# Patient Record
Sex: MALE | Race: WHITE | Employment: OTHER | URBAN - NONMETROPOLITAN AREA
[De-identification: names, ages, dates, MRNs, and addresses within clinical notes are randomized per-mention and may not be internally consistent; named-entity substitution may affect disease eponyms.]

---

## 2017-01-03 DIAGNOSIS — N40.1 BENIGN NON-NODULAR PROSTATIC HYPERPLASIA WITH LOWER URINARY TRACT SYMPTOMS: Primary | ICD-10-CM

## 2017-01-03 NOTE — TELEPHONE ENCOUNTER
flomax         Last Written Prescription Date:1/27/16  Last Fill Quantity: 90, # refills: 3    Last Office Visit with [facility], [facility] or [facility] prescribing provider:  12/22/16   Future Office Visit:    Next 5 appointments (look out 90 days)     Mar 28, 2017  2:00 PM   (Arrive by 1:45 PM)   Office Visit with ZION Robertson MD   [facility] [place] ([facility] [place])    [address] [place] [address] [address]  [place] [address]   [phone]                  BP Readings from Last 3 Encounters:   12/22/16 122/70   11/29/16 100/62   11/22/16 147/94

## 2017-01-04 DIAGNOSIS — K59.01 SLOW TRANSIT CONSTIPATION: Primary | ICD-10-CM

## 2017-01-04 RX ORDER — TAMSULOSIN HYDROCHLORIDE 0.4 MG/1
CAPSULE ORAL
Qty: 90 CAPSULE | Refills: 3 | Status: SHIPPED | OUTPATIENT
Start: 2017-01-04 | End: 2017-11-10

## 2017-01-05 RX ORDER — BISACODYL 5 MG
TABLET, DELAYED RELEASE (ENTERIC COATED) ORAL
Qty: 30 TABLET | Refills: 5 | Status: SHIPPED | OUTPATIENT
Start: 2017-01-05 | End: 2017-06-07

## 2017-03-24 ENCOUNTER — TELEPHONE (OUTPATIENT)
Dept: FAMILY MEDICINE | Facility: OTHER | Age: 82
End: 2017-03-24

## 2017-03-24 NOTE — TELEPHONE ENCOUNTER
I can not find in meds under epic.  i think they are just vitamin.  Can have 3 months with 3 rf. Call it in.

## 2017-03-24 NOTE — TELEPHONE ENCOUNTER
Pt of . [facility] request for Preservision caps. Not on current med list. Last office visit on 12.22.16.Please advise.Thank you

## 2017-03-24 NOTE — TELEPHONE ENCOUNTER
Called [facility] and spoke with [facility] pharmacist.Gave verbal for Preservison 3 months with 3 fills per .

## 2017-04-06 ENCOUNTER — TELEPHONE (OUTPATIENT)
Dept: FAMILY MEDICINE | Facility: OTHER | Age: 82
End: 2017-04-06

## 2017-04-06 NOTE — TELEPHONE ENCOUNTER
Daughter wont be able to get to patients appointment. States he did just see the cardiologist in February. She will give her the father the option to take a cab or will change appointment to May.  KIERRA BUTCHER

## 2017-04-06 NOTE — TELEPHONE ENCOUNTER
Pt daughter calling and can't be at the appointment tomorrow and wondering if this can be rescheduled to a date when she can be here.

## 2017-04-18 ENCOUNTER — MEDICAL CORRESPONDENCE (OUTPATIENT)
Dept: HEALTH INFORMATION MANAGEMENT | Facility: HOSPITAL | Age: 82
End: 2017-04-18

## 2017-04-27 DIAGNOSIS — K59.00 CONSTIPATION, UNSPECIFIED CONSTIPATION TYPE: ICD-10-CM

## 2017-04-27 RX ORDER — ASPIRIN 81 MG
TABLET, DELAYED RELEASE (ENTERIC COATED) ORAL
Qty: 90 TABLET | Refills: 3
Start: 2017-04-27

## 2017-04-27 NOTE — TELEPHONE ENCOUNTER
Written order for Colace 100 mg, 2 tabs daily entered into med list and written [facility].

## 2017-05-10 DIAGNOSIS — I48.19 PERSISTENT ATRIAL FIBRILLATION (H): ICD-10-CM

## 2017-05-11 RX ORDER — RIVAROXABAN 15 MG/1
TABLET, FILM COATED ORAL
Qty: 30 TABLET | Refills: 0 | Status: SHIPPED | OUTPATIENT
Start: 2017-05-11 | End: 2017-07-14

## 2017-05-13 ENCOUNTER — HOSPITAL ENCOUNTER (EMERGENCY)
Facility: HOSPITAL | Age: 82
Discharge: HOME | End: 2017-05-14
Attending: EMERGENCY MEDICINE | Admitting: EMERGENCY MEDICINE
Payer: COMMERCIAL

## 2017-05-13 DIAGNOSIS — K59.01 SLOW TRANSIT CONSTIPATION: Primary | ICD-10-CM

## 2017-05-13 DIAGNOSIS — R41.89 COGNITIVE DECLINE: ICD-10-CM

## 2017-05-13 LAB
ALBUMIN SERPL-MCNC: 3.3 G/DL (ref 3.4–5)
ALP SERPL-CCNC: 68 U/L (ref 40–150)
ALT SERPL W P-5'-P-CCNC: 12 U/L (ref 0–70)
ANION GAP SERPL CALCULATED.3IONS-SCNC: 11 MMOL/L (ref 3–14)
AST SERPL W P-5'-P-CCNC: 18 U/L (ref 0–45)
BASOPHILS # BLD AUTO: 0.1 10E9/L (ref 0–0.2)
BASOPHILS NFR BLD AUTO: 1 %
BILIRUB SERPL-MCNC: 0.3 MG/DL (ref 0.2–1.3)
BUN SERPL-MCNC: 22 MG/DL (ref 7–30)
CALCIUM SERPL-MCNC: 8.8 MG/DL (ref 8.5–10.1)
CHLORIDE SERPL-SCNC: 108 MMOL/L (ref 94–109)
CO2 SERPL-SCNC: 23 MMOL/L (ref 20–32)
CREAT SERPL-MCNC: 1.45 MG/DL (ref 0.66–1.25)
DIFFERENTIAL METHOD BLD: ABNORMAL
EOSINOPHIL # BLD AUTO: 0.2 10E9/L (ref 0–0.7)
EOSINOPHIL NFR BLD AUTO: 4 %
ERYTHROCYTE [DISTWIDTH] IN BLOOD BY AUTOMATED COUNT: 15.5 % (ref 10–15)
GFR SERPL CREATININE-BSD FRML MDRD: 45 ML/MIN/1.7M2
GLUCOSE SERPL-MCNC: 83 MG/DL (ref 70–99)
HCT VFR BLD AUTO: 35.6 % (ref 40–53)
HGB BLD-MCNC: 11.9 G/DL (ref 13.3–17.7)
IMM GRANULOCYTES # BLD: 0 10E9/L (ref 0–0.4)
IMM GRANULOCYTES NFR BLD: 0.2 %
LYMPHOCYTES # BLD AUTO: 1.9 10E9/L (ref 0.8–5.3)
LYMPHOCYTES NFR BLD AUTO: 37 %
MCH RBC QN AUTO: 30.5 PG (ref 26.5–33)
MCHC RBC AUTO-ENTMCNC: 33.4 G/DL (ref 31.5–36.5)
MCV RBC AUTO: 91 FL (ref 78–100)
MONOCYTES # BLD AUTO: 0.4 10E9/L (ref 0–1.3)
MONOCYTES NFR BLD AUTO: 8.2 %
NEUTROPHILS # BLD AUTO: 2.6 10E9/L (ref 1.6–8.3)
NEUTROPHILS NFR BLD AUTO: 49.6 %
NRBC # BLD AUTO: 0 10*3/UL
NRBC BLD AUTO-RTO: 0 /100
PLATELET # BLD AUTO: 154 10E9/L (ref 150–450)
POTASSIUM SERPL-SCNC: 4.7 MMOL/L (ref 3.4–5.3)
PROT SERPL-MCNC: 6.5 G/DL (ref 6.8–8.8)
RBC # BLD AUTO: 3.9 10E12/L (ref 4.4–5.9)
SODIUM SERPL-SCNC: 142 MMOL/L (ref 133–144)
WBC # BLD AUTO: 5.2 10E9/L (ref 4–11)

## 2017-05-13 PROCEDURE — 85025 COMPLETE CBC W/AUTO DIFF WBC: CPT | Performed by: EMERGENCY MEDICINE

## 2017-05-13 PROCEDURE — 36415 COLL VENOUS BLD VENIPUNCTURE: CPT | Performed by: EMERGENCY MEDICINE

## 2017-05-13 PROCEDURE — 74022 RADEX COMPL AQT ABD SERIES: CPT | Mod: TC

## 2017-05-13 PROCEDURE — 99284 EMERGENCY DEPT VISIT MOD MDM: CPT | Performed by: EMERGENCY MEDICINE

## 2017-05-13 PROCEDURE — 99284 EMERGENCY DEPT VISIT MOD MDM: CPT

## 2017-05-13 PROCEDURE — 80053 COMPREHEN METABOLIC PANEL: CPT | Performed by: EMERGENCY MEDICINE

## 2017-05-13 NOTE — ED AVS SNAPSHOT
[facility]    [address]    [place] [address]    Phone:  [phone]                                       Rogelio Garcia   MRN: [id number]    Department:  [facility]   Date of Visit:  5/13/2017           Patient Information     Date Of Birth          5/17/1920        Your diagnoses for this visit were:     Cognitive decline     Slow transit constipation        You were seen by Tapan Velasco MD.        Discharge Instructions         Constipation (Adult)  Constipation means that you have bowel movements that are less frequent than usual. Stools often become very hard and difficult to pass.  Constipation is very common. At some point in life it affects almost everyone. Since everyone's bowel habits are different, what is constipation to one person may not be to another. Your healthcare provider may do tests to diagnose constipation. It depends on what he or she finds when evaluating you.    Symptoms of constipation include:    Abdominal pain    Bloating    Vomiting    Painful bowel movements    Itching, swelling, bleeding, or pain around the anus  Causes  Constipation can have many causes. These include:    Diet low in fiber    Too much dairy    Not drinking enough liquids    Lack of exercise or physical activity. This is especially true for older adults.    Changes in lifestyle or daily routine, including pregnancy, aging, work, and travel    Frequent use or misuse of laxatives    Ignoring the urge to have a bowel movement or delaying it until later    Medicines, such as certain prescription pain medicines, iron supplements, antacids, certain antidepressants, and calcium supplements    Diseases like irritable bowel syndrome, bowel obstructions, stroke, diabetes, thyroid disease, Parkinson disease, hemorrhoids, and colon cancer  Complications  Potential complications of constipation can include:    Hemorrhoids    Rectal bleeding from hemorrhoids or anal fissures (skin  tears)    Hernias    Dependency on laxatives    Chronic constipation    Fecal impaction    Bowel obstruction or perforation  Home care  All treatment should be done after talking with your healthcare provider. This is especially true if you have another medical problems, are taking prescription medicines, or are an older adult. Treatment most often involves lifestyle changes. You may also need medicines. Your healthcare provider will tell you which will work best for you. Follow the advice below to help avoid this problem in the future.  Lifestyle changes  These lifestyle changes can help prevent constipation:    Diet. Eat a high-fiber diet, with fresh fruit and vegetables, and reduce dairy intake, meats, and processed foods    Fluids. It's important to get enough fluids each day. Drink plenty of water when you eat more fiber. If you are on diet that limits the amount of fluid you can have, talk about this with your healthcare provider.    Regular exercise. Check with your healthcare provider first.  Medications  Take any medicines as directed. Some laxatives are safe to use only every now and then. Others can be taken on a regular basis. Talk with your doctor or pharmacist if you have questions.  Prescription pain medicines can cause constipation. If you are taking this kind of medicine, ask your healthcare provider if you should also take a stool softener.  Medicines you may take to treat constipation include:    Fiber supplements    Stool softeners    Laxatives    Enemas    Rectal suppositories  Follow-up care  Follow up with your healthcare provider if symptoms don't get better in the next few days. You may need to have more tests or see a specialist.  Call 911  Call 911 if any of these occur:    Trouble breathing    Stiff, rigid abdomen that is severely painful to touch    Confusion    Fainting or loss of consciousness    Rapid heart rate    Chest pain  When to seek medical advice  Call your healthcare provider  right away if any of these occur:    Fever over 100.4 F (38 C)    Failure to resume normal bowel movements    Pain in your abdomen or back gets worse    Nausea or vomiting    Swelling in your abdomen    Blood in the stool    Black, tarry stool    Involuntary weight loss    Weakness    [id number] [employer] [address] [place] [address]. All rights reserved. This information is not intended as a substitute for professional medical care. Always follow your healthcare professional's instructions.             Review of your medicines      Our records show that you are taking the medicines listed below. If these are incorrect, please call your family doctor or clinic.        Dose / Directions Last dose taken    bisacodyl 5 MG EC tablet   Quantity:  30 tablet        TAKE 1 TABLET BY MOUTH DAILY AS NEEDED FOR CONSTIPATION   Refills:  5        docusate sodium 100 MG tablet   Commonly known as:  COLACE   Quantity:  90 tablet        Take 2 tabs (200 mg ) po daily for constipation   Refills:  3        polyethylene glycol powder   Commonly known as:  MIRALAX/GLYCOLAX   Quantity:  527 g        MIX 17GM (1 CAPFUL) IN 4 TO 8 OUNCES OF BEVERAGE DAILY   Refills:  6        PRESERVISION AREDS PO   Dose:  1 tablet        Take 1 tablet by mouth 2 times daily   Refills:  0        tamsulosin 0.4 MG capsule   Commonly known as:  FLOMAX   Quantity:  90 capsule        TAKE 1 CAPSULE BY MOUTH DAILY   Refills:  3        XARELTO 15 MG Tabs tablet   Quantity:  30 tablet   Generic drug:  rivaroxaban ANTICOAGULANT        TAKE 1 TABLET BY MOUTH DAILY WITH DINNER.   Refills:  0                Procedures and tests performed during your visit     CBC with platelets differential    Comprehensive metabolic panel    XR Abdomen Series      Orders Needing Specimen Collection     Ordered          05/13/17 2256  UA with Microscopic reflex to Culture - ROUTINE, Prio: Routine, Needs to be Collected     Scheduled Task Status  "  17 2257 Collect UA with Microscopic reflex to Culture Open   Order Class:  PCU Collect                  Pending Results     Date and Time Order Name Status Description    2017 2256 XR Abdomen Series In process             Pending Culture Results     No orders found for last 3 day(s).            Thank you for choosing [facility]       Thank you for choosing [facility] for your care. Our goal is always to provide you with excellent care. Hearing back from our patients is one way we can continue to improve our services. Please take a few minutes to complete the written survey that you may receive in the mail after you visit with us. Thank you!        [employer] Information     [email or web address] lets you send messages to your doctor, view your test results, renew your prescriptions, schedule appointments and more. To sign up, go to [email or web address] . Click on \"Log in\" on the left side of the screen, which will take you to the Welcome page. Then click on \"Sign up Now\" on the right side of the page.     You will be asked to enter the access code listed below, as well as some personal information. Please follow the directions to create your username and password.     Your access code is: [email or web address]  Expires: 2017  1:57 AM     Your access code will  in 90 days. If you need help or a new code, please call your [facility] clinic or [phone].        Care EveryWhere ID     This is your Care EveryWhere ID. This could be used by other organizations to access your [facility] medical records  [id number]        After Visit Summary       This is your record. Keep this with you and show to your community pharmacist(s) and doctor(s) at your next visit.                  "

## 2017-05-13 NOTE — ED AVS SNAPSHOT
[facility]    [address] [address]    Phone:  [phone]                                       Rogelio Garcia   MRN: [id number]    Department:  [facility]   Date of Visit:  5/13/2017           After Visit Summary Signature Page     I have received my discharge instructions, and my questions have been answered. I have discussed any challenges I see with this plan with the nurse or doctor.    ..........................................................................................................................................  Patient/Patient Representative Signature      ..........................................................................................................................................  Patient Representative Print Name and Relationship to Patient    ..................................................               ................................................  Date                                            Time    ..........................................................................................................................................  Reviewed by Signature/Title    ...................................................              ..............................................  Date                                                            Time

## 2017-05-14 VITALS
HEART RATE: 98 BPM | OXYGEN SATURATION: 98 % | SYSTOLIC BLOOD PRESSURE: 139 MMHG | RESPIRATION RATE: 16 BRPM | TEMPERATURE: 97.9 F | DIASTOLIC BLOOD PRESSURE: 101 MMHG

## 2017-05-14 NOTE — DISCHARGE INSTRUCTIONS

## 2017-05-14 NOTE — ED PROVIDER NOTES
"  History     Chief Complaint   Patient presents with     Constipation     x 4-5 days, denies pain or bloating     Altered Mental Status     family reports \"cognitive issues,\" concern for UTI      HPI  Rogelio Garcia is a 89 year old male who presents to the emergency department accompanied by the stepdaughter.  Patient reports constipation and has not been passing stool for the last 5 days.  Denies any abdominal pain, nausea or vomiting or fever.  He has had chronic constipation for several years and is currently on MiraLAX and to other over-the-counter laxatives.    The stepdaughter is also concerned about cognitive decline.  Patient was not able to recorgnize one of the family members earlier this morning.  His cognition and memory have been declining over the last few months.  So far they've not been evaluated by a neurologist.  She also wants a UTI to be ruled out.    I have reviewed the Medications, Allergies, Past Medical and Surgical History, and Social History in the Epic system.    Review of Systems   All other systems reviewed and are negative.      Physical Exam   BP: 146/90  Pulse: 98  Temp: 96.7  F (35.9  C)  Resp: 20  SpO2: 99 %  Physical Exam   Constitutional: He is oriented to person, place, and time. He appears well-developed and well-nourished. No distress.   HENT:   Head: Atraumatic.   Mouth/Throat: Oropharynx is clear and moist. No oropharyngeal exudate.   Eyes: Pupils are equal, round, and reactive to light. No scleral icterus.   Neck: Normal range of motion. Neck supple.   Cardiovascular: Normal rate, regular rhythm, normal heart sounds and intact distal pulses.    Pulmonary/Chest: Breath sounds normal. No respiratory distress. He has no wheezes. He has no rales.   Abdominal: Soft. Bowel sounds are normal. He exhibits no mass. There is no tenderness. There is no rebound.   Musculoskeletal: He exhibits no edema or tenderness.   Neurological: He is alert and oriented to person, place, and " time.   Skin: Skin is warm. No rash noted. He is not diaphoretic.   Nursing note and vitals reviewed.      ED Course   Patient given Fleet enema with no effect.  Normal CBC, CMP.    ED Course     Procedures         Labs Ordered and Resulted from Time of ED Arrival Up to the Time of Departure from the ED   CBC WITH PLATELETS DIFFERENTIAL - Abnormal; Notable for the following:        Result Value    RBC Count 3.90 (*)     Hemoglobin 11.9 (*)     Hematocrit 35.6 (*)     RDW 15.5 (*)     All other components within normal limits   COMPREHENSIVE METABOLIC PANEL - Abnormal; Notable for the following:     Creatinine 1.45 (*)     GFR Estimate 45 (*)     GFR Estimate If Black 54 (*)     Albumin 3.3 (*)     Protein Total 6.5 (*)     All other components within normal limits   ROUTINE UA WITH MICROSCOPIC REFLEX TO CULTURE       Assessments & Plan (with Medical Decision Making)     I have reviewed the nursing notes.    I have reviewed the findings, diagnosis, plan and need for follow up with the patient.    New Prescriptions    No medications on file       Final diagnoses:   Cognitive decline   Slow transit constipation     Discussed with patient and her stepdaughter that the patient may need colonoscopy to rule out colon mass that may be causing obstruction.  He will discuss this with his PCP.    Also discussed with patient and her stepdaughter that patient will need to be seen by neurologist because of the obesity decline to rule out dementia.  He may also need MRI.  All questions answered.    5/13/2017   [facility]     Tapan Velasco MD  05/14/17 0143

## 2017-05-14 NOTE — ED NOTES
Reviewed discharge instruction with pt and family. No questions at this time. No results from enema but denies pain. Will seek primary care provider for follow up prn.

## 2017-05-22 ENCOUNTER — OFFICE VISIT (OUTPATIENT)
Dept: FAMILY MEDICINE | Facility: OTHER | Age: 82
End: 2017-05-22
Attending: FAMILY MEDICINE
Payer: COMMERCIAL

## 2017-05-22 VITALS
BODY MASS INDEX: 21.67 KG/M2 | TEMPERATURE: 97.4 F | OXYGEN SATURATION: 98 % | SYSTOLIC BLOOD PRESSURE: 116 MMHG | HEIGHT: 68 IN | DIASTOLIC BLOOD PRESSURE: 56 MMHG | WEIGHT: 143 LBS | HEART RATE: 79 BPM

## 2017-05-22 DIAGNOSIS — K59.01 SLOW TRANSIT CONSTIPATION: Primary | ICD-10-CM

## 2017-05-22 PROCEDURE — 99213 OFFICE O/P EST LOW 20 MIN: CPT | Performed by: FAMILY MEDICINE

## 2017-05-22 PROCEDURE — 99212 OFFICE O/P EST SF 10 MIN: CPT

## 2017-05-22 ASSESSMENT — PAIN SCALES - GENERAL: PAINLEVEL: NO PAIN (0)

## 2017-05-22 NOTE — PROGRESS NOTES
[facility]    Rogelio Garcia, 89 year old, male presents with   Chief Complaint   Patient presents with     Constipation     Follow up from the ER seen 05/13/2017, Slow transit constipation, coginitive decline. states metamucil is helping. Last BM was this morning. Denies constipation.      Memory Loss     He has some questions regding possible memory loss.  Just the fact that he's been analyzing the situation tells me he does not have significant dementia.  He has a lot of self-analysis.  He is very organized.  He is worried because he has some questions regarding thee for his medication for the constipation.  I think some of his issues are that he's very organized and he gets very anxious and with new data that it might be hard to process but he has not had any major problems such as getting lost       PAST MEDICAL HISTORY:  Past Medical History:   Diagnosis Date     Aortic sclerosis 03/09/2012    with mod aortic stenosis peak 59 mean 34 11-09     Benign prostate hyperplasia, NOS 01/15/2013     Erectile Dysfunction 01/01/2011     Hypercholesterolemia 01/01/2011     Hypertension, Benign 01/01/2011       PAST SURGICAL HISTORY:  Past Surgical History:   Procedure Laterality Date     Carpal tunnel release  2010    RT     Cataract  2012    RT     COLONOSCOPY  2012    failed,so did Barium Enema- neg     ENDOSCOPIC RELEASE CARPAL TUNNEL Right 4/7/2015    Procedure: ENDOSCOPIC RELEASE CARPAL TUNNEL;  Surgeon: Antwon Soto MD;  Location: [facility]     hernia repair  2003    RT, Hernia, inguinal     left inguinal hernia  2012     TRANSCATHETER AORTIC-VALVE REPLACEMENT         MEDICATIONS:  Prior to Admission medications    Medication Sig Start Date End Date Taking? Authorizing Provider   XARELTO 15 MG TABS tablet TAKE 1 TABLET BY MOUTH DAILY WITH DINNER. 5/11/17  Yes ZION Robertson MD   docusate sodium (COLACE) 100 MG tablet Take 2 tabs (200 mg ) po daily for constipation 4/27/17  Yes ZION Robertson  "MD Jorje   bisacodyl (DULCOLAX) 5 MG EC tablet TAKE 1 TABLET BY MOUTH DAILY AS NEEDED FOR CONSTIPATION 1/5/17  Yes Suresh Busby MD   tamsulosin (FLOMAX) 0.4 MG capsule TAKE 1 CAPSULE BY MOUTH DAILY 1/4/17  Yes ZION Robertson MD   polyethylene glycol (MIRALAX/GLYCOLAX) powder MIX 17GM (1 CAPFUL) IN 4 TO 8 OUNCES OF BEVERAGE DAILY 11/8/16  Yes ZION Robertson MD   Multiple Vitamins-Minerals (PRESERVISION AREDS PO) Take 1 tablet by mouth 2 times daily    Yes Reported, Patient       ALLERGIES:     Allergies   Allergen Reactions     Levofloxacin Other (See Comments)     Bilateral heel tendonitis       ROS:  Constitutional, neuro, ENT, endocrine, pulmonary, cardiac, gastrointestinal, genitourinary, musculoskeletal, integument and psychiatric systems are negative, except as otherwise noted.      EXAM:  /56 (BP Location: Left arm, Patient Position: Chair, Cuff Size: Adult Regular)  Pulse 79  Temp 97.4  F (36.3  C) (Tympanic)  Ht 5' 8\" (1.727 m)  Wt 143 lb (64.9 kg)  SpO2 98%  BMI 21.74 kg/m2 Body mass index is 21.74 kg/(m^2).   GENERAL APPEARANCE: healthy, alert and no distress  EYES: Eyes grossly normal to inspection, PERRL and conjunctivae and sclerae normal  RESP: lungs clear to auscultation - no rales, rhonchi or wheezes  ABDOMEN: soft, nontender, without hepatosplenomegaly or masses and bowel sounds normal  NEURO: Normal strength and tone, mentation intact and speech normal  PSYCH: mentation appears normal and affect normal/bright  Lab/ X-ray  No results found for this or any previous visit (from the past 24 hour(s)).    ASSESSMENT/PLAN:    ICD-10-CM    1. Slow transit constipation K59.01 He had bowel movement today.  Told him to keep the Metamucil daily unless he is having significantly loose stools then back off on the frequency.  He also is using Dulcolax and Colace.  Here with daughter.  Seems to be doing well.  We explained there is a variation with stool frequency but the most important thing  " That I don't want him to have to  Strain to have a bowel movement. We also discussed that it was brought up by him getting a colonoscopy.  We discussed he's at risk if he were to do it for perforation and he could die at and his age he states he doesn't want aggressive things done.  His daughter was here in both patient and his daughter felt comfortable not pursuing any aggressive studies such as colonoscopy.Regarding his memory he is showing significant insight into his situation and at this point I don't think he needs any further workup.          KD Robertson MD  May 22, 2017

## 2017-05-22 NOTE — NURSING NOTE
"Chief Complaint   Patient presents with     Constipation     Follow up from the ER seen 05/13/2017, Slow transit constipation, coginitive decline. states metamucil is helping. Last BM was this morning. Denies constipation.        Initial /56 (BP Location: Left arm, Patient Position: Chair, Cuff Size: Adult Regular)  Pulse 79  Temp 97.4  F (36.3  C) (Tympanic)  Ht 5' 8\" (1.727 m)  Wt 143 lb (64.9 kg)  SpO2 98%  BMI 21.74 kg/m2 Estimated body mass index is 21.74 kg/(m^2) as calculated from the following:    Height as of this encounter: 5' 8\" (1.727 m).    Weight as of this encounter: 143 lb (64.9 kg).  Medication Reconciliation: complete     KIERRA BUTCHER      "

## 2017-05-22 NOTE — MR AVS SNAPSHOT
"              After Visit Summary   2017    Rogelio Garcia    MRN: [id number]           Patient Information     Date Of Birth          1920        Visit Information        Provider Department      2017 3:15 PM [facility] MD [facility]        Care Instructions    Follow up as needed        Follow-ups after your visit        Who to contact     If you have questions or need follow up information about today's clinic visit or your schedule please contact [facility] directly at [phone].  Normal or non-critical lab and imaging results will be communicated to you by MyChart, letter or phone within 4 business days after the clinic has received the results. If you do not hear from us within 7 days, please contact the clinic through [employer] or phone. If you have a critical or abnormal lab result, we will notify you by phone as soon as possible.  Submit refill requests through [employer] or call your pharmacy and they will forward the refill request to us. Please allow 3 business days for your refill to be completed.          Additional Information About Your Visit        MyChart Information     [employer] lets you send messages to your doctor, view your test results, renew your prescriptions, schedule appointments and more. To sign up, go to [email or web address] . Click on \"Log in\" on the left side of the screen, which will take you to the Welcome page. Then click on \"Sign up Now\" on the right side of the page.     You will be asked to enter the access code listed below, as well as some personal information. Please follow the directions to create your username and password.     Your access code is: [email or web address]  Expires: 2017  1:57 AM     Your access code will  in 90 days. If you need help or a new code, please call your [facility] or [phone].        Care EveryWhere ID     This is your Care EveryWhere ID. This could be used by other organizations to " "access your [facility] medical records  [id number]        Your Vitals Were     Pulse Temperature Height Pulse Oximetry BMI (Body Mass Index)       79 97.4  F (36.3  C) (Tympanic) 5' 8\" (1.727 m) 98% 21.74 kg/m2        Blood Pressure from Last 3 Encounters:   05/22/17 116/56   05/14/17 (!) 139/101   12/22/16 122/70    Weight from Last 3 Encounters:   05/22/17 143 lb (64.9 kg)   12/22/16 141 lb (64 kg)   11/29/16 140 lb (63.5 kg)              Today, you had the following     No orders found for display       Primary Care Provider Office Phone # Fax #    R Jorje Robertson [phone]       [facility] [address] [place] [address]        Thank you!     Thank you for choosing [facility]  for your care. Our goal is always to provide you with excellent care. Hearing back from our patients is one way we can continue to improve our services. Please take a few minutes to complete the written survey that you may receive in the mail after your visit with us. Thank you!             Your Updated Medication List - Protect others around you: Learn how to safely use, store and throw away your medicines at www.disposemymeds.org.          This list is accurate as of: 5/22/17  3:43 PM.  Always use your most recent med list.                   Brand Name Dispense Instructions for use    bisacodyl 5 MG EC tablet     30 tablet    TAKE 1 TABLET BY MOUTH DAILY AS NEEDED FOR CONSTIPATION       docusate sodium 100 MG tablet    COLACE    90 tablet    Take 2 tabs (200 mg ) po daily for constipation       polyethylene glycol powder    MIRALAX/GLYCOLAX    527 g    MIX 17GM (1 CAPFUL) IN 4 TO 8 OUNCES OF BEVERAGE DAILY       PRESERVISION AREDS PO      Take 1 tablet by mouth 2 times daily       tamsulosin 0.4 MG capsule    FLOMAX    90 capsule    TAKE 1 CAPSULE BY MOUTH DAILY       XARELTO 15 MG Tabs tablet   Generic drug:  rivaroxaban ANTICOAGULANT     30 tablet    TAKE 1 TABLET BY MOUTH DAILY " WITH DINNER.

## 2017-06-07 DIAGNOSIS — K59.01 SLOW TRANSIT CONSTIPATION: ICD-10-CM

## 2017-06-09 RX ORDER — BISACODYL 5 MG
TABLET, DELAYED RELEASE (ENTERIC COATED) ORAL
Qty: 30 TABLET | Refills: 7 | Status: SHIPPED | OUTPATIENT
Start: 2017-06-09 | End: 2017-11-10

## 2017-06-09 NOTE — TELEPHONE ENCOUNTER
Dulcolax 5mg      Last Written Prescription Date: 12/28/15  Last Fill Quantity: 30,  # refills: 0   Last Office Visit with G, P or [facility] prescribing provider: 5/22/17

## 2017-07-14 DIAGNOSIS — I48.19 PERSISTENT ATRIAL FIBRILLATION (H): ICD-10-CM

## 2017-07-18 RX ORDER — RIVAROXABAN 15 MG/1
TABLET, FILM COATED ORAL
Qty: 30 TABLET | Refills: 3 | Status: SHIPPED | OUTPATIENT
Start: 2017-07-18

## 2017-07-19 ENCOUNTER — MEDICAL CORRESPONDENCE (OUTPATIENT)
Dept: HEALTH INFORMATION MANAGEMENT | Facility: HOSPITAL | Age: 82
End: 2017-07-19

## 2017-08-03 ENCOUNTER — MEDICAL CORRESPONDENCE (OUTPATIENT)
Dept: HEALTH INFORMATION MANAGEMENT | Facility: HOSPITAL | Age: 82
End: 2017-08-03

## 2017-10-02 ENCOUNTER — MEDICAL CORRESPONDENCE (OUTPATIENT)
Dept: HEALTH INFORMATION MANAGEMENT | Facility: HOSPITAL | Age: 82
End: 2017-10-02

## 2017-11-10 DIAGNOSIS — K59.01 SLOW TRANSIT CONSTIPATION: ICD-10-CM

## 2017-11-10 DIAGNOSIS — N40.1 BENIGN NON-NODULAR PROSTATIC HYPERPLASIA WITH LOWER URINARY TRACT SYMPTOMS: ICD-10-CM

## 2017-11-14 RX ORDER — TAMSULOSIN HYDROCHLORIDE 0.4 MG/1
CAPSULE ORAL
Qty: 30 CAPSULE | Refills: 4 | Status: SHIPPED | OUTPATIENT
Start: 2017-11-14 | End: 2018-05-09

## 2018-05-09 DIAGNOSIS — N40.1 BENIGN NON-NODULAR PROSTATIC HYPERPLASIA WITH LOWER URINARY TRACT SYMPTOMS: ICD-10-CM

## 2018-05-09 RX ORDER — TAMSULOSIN HYDROCHLORIDE 0.4 MG/1
CAPSULE ORAL
Qty: 30 CAPSULE | Refills: 0 | Status: SHIPPED | OUTPATIENT
Start: 2018-05-09 | End: 2018-06-06

## 2018-05-09 NOTE — LETTER
May 9, 2018      Rogelio Garcia  [address]  [facility] [address]        Dear Rogelio,       APPOINTMENT REMINDER:   Our records indicates that it is time for you to be seen for your annual exam.     Your current medication request will be approved for one refill but you will need to be seen before any additional refills can be approved. Taking care of your health is important to us, and ongoing visits with your provider are vital to your care.    We look forward to seeing you in the near future.  You may call our office at [phone] to schedule a visit.     Please disregard this notice if you have already made an appointment.      Sincerely,  ZION Robertson MD

## 2018-06-06 DIAGNOSIS — N40.1 BENIGN NON-NODULAR PROSTATIC HYPERPLASIA WITH LOWER URINARY TRACT SYMPTOMS: ICD-10-CM

## 2018-06-06 NOTE — TELEPHONE ENCOUNTER
Flomax  Last Written Prescription Date: 5/9/18  Last Fill Quantity: 30 # of Refills: 0  Last Office Visit: 5/22/17

## 2018-06-06 NOTE — LETTER
[facility] [place]  [address] [place] [address]                    Rogelio Garcia  [address]  [facility] [address]          June 7, 2018       Dear Rogelio Garcia,    APPOINTMENT REMINDER:       Our record indicates that it is time for you to be seen for an office visit with .  You may call our office at [phone] to schedule an appointment for an annual exam.  Please disregard this notice if you have already made an appointment.      Sincerely,    MD Kentrell    MRN: [id number]

## 2018-06-07 RX ORDER — TAMSULOSIN HYDROCHLORIDE 0.4 MG/1
CAPSULE ORAL
Qty: 30 CAPSULE | Refills: 0 | Status: SHIPPED | OUTPATIENT
Start: 2018-06-07 | End: 2018-07-18

## 2018-07-18 DIAGNOSIS — N40.1 BENIGN NON-NODULAR PROSTATIC HYPERPLASIA WITH LOWER URINARY TRACT SYMPTOMS: ICD-10-CM

## 2018-07-18 NOTE — TELEPHONE ENCOUNTER
Flomax last filled on 6.7.18 #30. Last office visit on 5.22.17. Medication pended.No upcoming appointments.Thank you.

## 2018-07-19 RX ORDER — TAMSULOSIN HYDROCHLORIDE 0.4 MG/1
CAPSULE ORAL
Qty: 30 CAPSULE | Refills: 0 | Status: SHIPPED | OUTPATIENT
Start: 2018-07-19